# Patient Record
Sex: FEMALE | Race: WHITE | NOT HISPANIC OR LATINO
[De-identification: names, ages, dates, MRNs, and addresses within clinical notes are randomized per-mention and may not be internally consistent; named-entity substitution may affect disease eponyms.]

---

## 2018-08-01 ENCOUNTER — APPOINTMENT (OUTPATIENT)
Dept: MAMMOGRAPHY | Facility: IMAGING CENTER | Age: 63
End: 2018-08-01
Payer: MEDICARE

## 2018-08-01 ENCOUNTER — OUTPATIENT (OUTPATIENT)
Dept: OUTPATIENT SERVICES | Facility: HOSPITAL | Age: 63
LOS: 1 days | End: 2018-08-01
Payer: MEDICARE

## 2018-08-01 DIAGNOSIS — Z00.8 ENCOUNTER FOR OTHER GENERAL EXAMINATION: ICD-10-CM

## 2018-08-01 PROCEDURE — 77063 BREAST TOMOSYNTHESIS BI: CPT | Mod: 26

## 2018-08-01 PROCEDURE — 77067 SCR MAMMO BI INCL CAD: CPT | Mod: 26

## 2018-08-01 PROCEDURE — 77067 SCR MAMMO BI INCL CAD: CPT

## 2018-08-01 PROCEDURE — 77063 BREAST TOMOSYNTHESIS BI: CPT

## 2019-01-31 ENCOUNTER — RESULT REVIEW (OUTPATIENT)
Age: 64
End: 2019-01-31

## 2019-11-27 ENCOUNTER — APPOINTMENT (OUTPATIENT)
Dept: ULTRASOUND IMAGING | Facility: IMAGING CENTER | Age: 64
End: 2019-11-27
Payer: MEDICARE

## 2019-11-27 ENCOUNTER — OUTPATIENT (OUTPATIENT)
Dept: OUTPATIENT SERVICES | Facility: HOSPITAL | Age: 64
LOS: 1 days | End: 2019-11-27
Payer: MEDICARE

## 2019-11-27 ENCOUNTER — APPOINTMENT (OUTPATIENT)
Dept: MAMMOGRAPHY | Facility: IMAGING CENTER | Age: 64
End: 2019-11-27
Payer: MEDICARE

## 2019-11-27 DIAGNOSIS — Z00.8 ENCOUNTER FOR OTHER GENERAL EXAMINATION: ICD-10-CM

## 2019-11-27 PROCEDURE — 77067 SCR MAMMO BI INCL CAD: CPT | Mod: 26

## 2019-11-27 PROCEDURE — 76641 ULTRASOUND BREAST COMPLETE: CPT | Mod: 26,50

## 2019-11-27 PROCEDURE — 77063 BREAST TOMOSYNTHESIS BI: CPT | Mod: 26

## 2019-11-27 PROCEDURE — 77063 BREAST TOMOSYNTHESIS BI: CPT

## 2019-11-27 PROCEDURE — 76641 ULTRASOUND BREAST COMPLETE: CPT

## 2019-11-27 PROCEDURE — 77067 SCR MAMMO BI INCL CAD: CPT

## 2020-11-23 ENCOUNTER — RESULT REVIEW (OUTPATIENT)
Age: 65
End: 2020-11-23

## 2021-03-17 ENCOUNTER — APPOINTMENT (OUTPATIENT)
Dept: RADIOLOGY | Facility: IMAGING CENTER | Age: 66
End: 2021-03-17
Payer: MEDICARE

## 2021-03-17 ENCOUNTER — APPOINTMENT (OUTPATIENT)
Dept: ULTRASOUND IMAGING | Facility: IMAGING CENTER | Age: 66
End: 2021-03-17
Payer: MEDICARE

## 2021-03-17 ENCOUNTER — APPOINTMENT (OUTPATIENT)
Dept: MAMMOGRAPHY | Facility: IMAGING CENTER | Age: 66
End: 2021-03-17
Payer: MEDICARE

## 2021-03-17 ENCOUNTER — OUTPATIENT (OUTPATIENT)
Dept: OUTPATIENT SERVICES | Facility: HOSPITAL | Age: 66
LOS: 1 days | End: 2021-03-17
Payer: MEDICARE

## 2021-03-17 DIAGNOSIS — Z00.8 ENCOUNTER FOR OTHER GENERAL EXAMINATION: ICD-10-CM

## 2021-03-17 PROCEDURE — 76641 ULTRASOUND BREAST COMPLETE: CPT | Mod: 26,50

## 2021-03-17 PROCEDURE — 76641 ULTRASOUND BREAST COMPLETE: CPT

## 2021-03-17 PROCEDURE — 77063 BREAST TOMOSYNTHESIS BI: CPT

## 2021-03-17 PROCEDURE — 77080 DXA BONE DENSITY AXIAL: CPT | Mod: 26

## 2021-03-17 PROCEDURE — 77067 SCR MAMMO BI INCL CAD: CPT | Mod: 26

## 2021-03-17 PROCEDURE — 77063 BREAST TOMOSYNTHESIS BI: CPT | Mod: 26

## 2021-03-17 PROCEDURE — 77080 DXA BONE DENSITY AXIAL: CPT

## 2021-03-17 PROCEDURE — 77067 SCR MAMMO BI INCL CAD: CPT

## 2021-11-29 ENCOUNTER — RESULT REVIEW (OUTPATIENT)
Age: 66
End: 2021-11-29

## 2022-01-24 ENCOUNTER — RESULT REVIEW (OUTPATIENT)
Age: 67
End: 2022-01-24

## 2022-03-07 ENCOUNTER — RESULT REVIEW (OUTPATIENT)
Age: 67
End: 2022-03-07

## 2022-03-18 ENCOUNTER — APPOINTMENT (OUTPATIENT)
Dept: MAMMOGRAPHY | Facility: IMAGING CENTER | Age: 67
End: 2022-03-18
Payer: MEDICARE

## 2022-03-18 ENCOUNTER — OUTPATIENT (OUTPATIENT)
Dept: OUTPATIENT SERVICES | Facility: HOSPITAL | Age: 67
LOS: 1 days | End: 2022-03-18
Payer: MEDICARE

## 2022-03-18 ENCOUNTER — APPOINTMENT (OUTPATIENT)
Dept: ULTRASOUND IMAGING | Facility: IMAGING CENTER | Age: 67
End: 2022-03-18
Payer: MEDICARE

## 2022-03-18 DIAGNOSIS — Z00.8 ENCOUNTER FOR OTHER GENERAL EXAMINATION: ICD-10-CM

## 2022-03-18 PROCEDURE — 77063 BREAST TOMOSYNTHESIS BI: CPT

## 2022-03-18 PROCEDURE — 77063 BREAST TOMOSYNTHESIS BI: CPT | Mod: 26

## 2022-03-18 PROCEDURE — 77067 SCR MAMMO BI INCL CAD: CPT

## 2022-03-18 PROCEDURE — 76641 ULTRASOUND BREAST COMPLETE: CPT | Mod: 26,50

## 2022-03-18 PROCEDURE — 77067 SCR MAMMO BI INCL CAD: CPT | Mod: 26

## 2022-03-18 PROCEDURE — 76641 ULTRASOUND BREAST COMPLETE: CPT

## 2022-05-17 ENCOUNTER — OUTPATIENT (OUTPATIENT)
Dept: OUTPATIENT SERVICES | Facility: HOSPITAL | Age: 67
LOS: 1 days | End: 2022-05-17
Payer: MEDICARE

## 2022-05-17 VITALS
DIASTOLIC BLOOD PRESSURE: 84 MMHG | HEART RATE: 69 BPM | TEMPERATURE: 98 F | WEIGHT: 106.92 LBS | HEIGHT: 57.5 IN | RESPIRATION RATE: 18 BRPM | OXYGEN SATURATION: 98 % | SYSTOLIC BLOOD PRESSURE: 158 MMHG

## 2022-05-17 DIAGNOSIS — N84.0 POLYP OF CORPUS UTERI: ICD-10-CM

## 2022-05-17 DIAGNOSIS — Z01.812 ENCOUNTER FOR PREPROCEDURAL LABORATORY EXAMINATION: ICD-10-CM

## 2022-05-17 DIAGNOSIS — N83.202 UNSPECIFIED OVARIAN CYST, LEFT SIDE: ICD-10-CM

## 2022-05-17 DIAGNOSIS — I10 ESSENTIAL (PRIMARY) HYPERTENSION: ICD-10-CM

## 2022-05-17 DIAGNOSIS — E11.9 TYPE 2 DIABETES MELLITUS WITHOUT COMPLICATIONS: ICD-10-CM

## 2022-05-17 DIAGNOSIS — Z98.890 OTHER SPECIFIED POSTPROCEDURAL STATES: Chronic | ICD-10-CM

## 2022-05-17 LAB
A1C WITH ESTIMATED AVERAGE GLUCOSE RESULT: 6.2 % — HIGH (ref 4–5.6)
ANION GAP SERPL CALC-SCNC: 12 MMOL/L — SIGNIFICANT CHANGE UP (ref 7–14)
BLD GP AB SCN SERPL QL: NEGATIVE — SIGNIFICANT CHANGE UP
BUN SERPL-MCNC: 24 MG/DL — HIGH (ref 7–23)
CALCIUM SERPL-MCNC: 10.1 MG/DL — SIGNIFICANT CHANGE UP (ref 8.4–10.5)
CHLORIDE SERPL-SCNC: 102 MMOL/L — SIGNIFICANT CHANGE UP (ref 98–107)
CO2 SERPL-SCNC: 26 MMOL/L — SIGNIFICANT CHANGE UP (ref 22–31)
CREAT SERPL-MCNC: 0.67 MG/DL — SIGNIFICANT CHANGE UP (ref 0.5–1.3)
EGFR: 95 ML/MIN/1.73M2 — SIGNIFICANT CHANGE UP
ESTIMATED AVERAGE GLUCOSE: 131 — SIGNIFICANT CHANGE UP
GLUCOSE SERPL-MCNC: 89 MG/DL — SIGNIFICANT CHANGE UP (ref 70–99)
HCT VFR BLD CALC: 37.7 % — SIGNIFICANT CHANGE UP (ref 34.5–45)
HGB BLD-MCNC: 12 G/DL — SIGNIFICANT CHANGE UP (ref 11.5–15.5)
MCHC RBC-ENTMCNC: 28.4 PG — SIGNIFICANT CHANGE UP (ref 27–34)
MCHC RBC-ENTMCNC: 31.8 GM/DL — LOW (ref 32–36)
MCV RBC AUTO: 89.1 FL — SIGNIFICANT CHANGE UP (ref 80–100)
NRBC # BLD: 0 /100 WBCS — SIGNIFICANT CHANGE UP
NRBC # FLD: 0 K/UL — SIGNIFICANT CHANGE UP
PLATELET # BLD AUTO: 161 K/UL — SIGNIFICANT CHANGE UP (ref 150–400)
POTASSIUM SERPL-MCNC: 4.2 MMOL/L — SIGNIFICANT CHANGE UP (ref 3.5–5.3)
POTASSIUM SERPL-SCNC: 4.2 MMOL/L — SIGNIFICANT CHANGE UP (ref 3.5–5.3)
RBC # BLD: 4.23 M/UL — SIGNIFICANT CHANGE UP (ref 3.8–5.2)
RBC # FLD: 13.3 % — SIGNIFICANT CHANGE UP (ref 10.3–14.5)
RH IG SCN BLD-IMP: NEGATIVE — SIGNIFICANT CHANGE UP
SODIUM SERPL-SCNC: 140 MMOL/L — SIGNIFICANT CHANGE UP (ref 135–145)
WBC # BLD: 5.58 K/UL — SIGNIFICANT CHANGE UP (ref 3.8–10.5)
WBC # FLD AUTO: 5.58 K/UL — SIGNIFICANT CHANGE UP (ref 3.8–10.5)

## 2022-05-17 PROCEDURE — 93010 ELECTROCARDIOGRAM REPORT: CPT

## 2022-05-17 RX ORDER — MELOXICAM 15 MG/1
1 TABLET ORAL
Qty: 0 | Refills: 0 | DISCHARGE

## 2022-05-17 RX ORDER — SODIUM CHLORIDE 9 MG/ML
1000 INJECTION, SOLUTION INTRAVENOUS
Refills: 0 | Status: DISCONTINUED | OUTPATIENT
Start: 2022-05-17 | End: 2022-05-31

## 2022-05-17 RX ORDER — ASPIRIN/CALCIUM CARB/MAGNESIUM 324 MG
1 TABLET ORAL
Qty: 0 | Refills: 0 | DISCHARGE

## 2022-05-17 NOTE — H&P PST ADULT - NEGATIVE NEUROLOGICAL SYMPTOMS
no transient paralysis/no weakness/no generalized seizures/no syncope/no vertigo/no difficulty walking/no headache/no confusion

## 2022-05-17 NOTE — H&P PST ADULT - ATTENDING COMMENTS
I have consented the patient for the proposed procedure including for a pelvic examination under anesthesia by myself and the assistants in the room.

## 2022-05-17 NOTE — H&P PST ADULT - PROBLEM SELECTOR PLAN 4
Scheduled for laparoscopi b/l salpingo oophorectomy, peritoneal washings, operative hysteroscopy, polypectomy with myosure, d&c on 6/2/2022  Written & verbal preop instructions, gi prophylaxis & surgical soap given  Pt verbalized good understanding.  Teach back done on surgical soap instructions. Scheduled for laparoscopic b/l salpingo oophorectomy, peritoneal washings, operative hysteroscopy, polypectomy with myosure, d&c on 6/2/2022  Written & verbal preop instructions, gi prophylaxis & surgical soap given  Pt verbalized good understanding.  Teach back done on surgical soap instructions. Scheduled for laparoscopic b/l salpingo oophorectomy, peritoneal washings, operative hysteroscopy, polypectomy with myosure, d&c on 6/2/2022  Written & verbal preop instructions, gi prophylaxis & surgical soap given  Pt verbalized good understanding.  Teach back done on surgical soap instructions.  NELSON precautions recommended.

## 2022-05-17 NOTE — H&P PST ADULT - PROBLEM SELECTOR PLAN 1
Pt instructed to continue per routine schedule in Pm.  elevated b/o during PST visit.  Pt denies symptomatology  medical eval requested, pending copy of report.

## 2022-05-17 NOTE — H&P PST ADULT - TEMPERATURE IN CELSIUS (DEGREES C)
"To better understand risks for vision problems,please visit: [email or web address].org    To minimize eyestrain and Lower the risk of becoming near-sighted:   - Limit use of near electronic devices to no more than 20 minutes at a time, no more than 2 hours a day    - No electronic devices before age 2    -Avoid watching screens (TV, devices, etc.)  in complete darkness    - Spend 1-3 hours outdoors daily so that the eyes are exposed to natural light    Hyperopia (Farsightedness)      Farsightedness, or hyperopia, as it is medically termed, is a vision condition in which distant objects are usually seen clearly, but close ones do not come into proper focus. Farsightedness occurs if your eyeball is too short or the cornea has too little curvature, so light entering your eye is not focused correctly.  Common signs of farsightedness include difficulty in concentrating and maintaining a clear focus on near objects, eye strain, fatigue and/or headaches after close work, aching or burning eyes, irritability or nervousness after sustained concentration.  Common vision screenings, often done in schools, are generally ineffective in detecting farsightedness. A comprehensive optometric examination will include testing for farsightedness.  In mild cases of farsightedness, your eyes may be able to compensate without corrective lenses. In other cases, your optometrist can prescribe eyeglasses or contact lenses to optically correct farsightedness by altering the way the light enters your eyes      Courtesy of the [employer]      Kailey's eye discomfort is being caused by a spasm of the accommodative sytem.  Whenever we look up close, the eye focuses (or accomodates) to clear up the near target.  When we do a lot of near work (homework, reading, computers, phones, texting, tablets, handheld video games, etc), the focusing sytem can "get stuck" and go into a spasm. This causes eye strain, headaches and sometimes blurry " vision (far away and close up).  To address this, Limit use of near electronic devices to no more than 20 minutes at a time, no  More than 2 hours daily. Wearing +0.75 or +1.00 Over the counter glasses will also help to relieve any eyestrain.     Infant Vision:   Birth to 24 Months of Age    Babies learn to see over a period of time, much like they learn to walk and talk. They are not born with all the visual abilities they need in life. The ability to focus their eyes, move them accurately, and use them together as a team must be learned. Also, they need to learn how to use the visual information the eyes send to their brain in order to understand the world around them and interact with it appropriately.      Vision, and how the brain uses visual information, are learned skills.   From birth, babies begin exploring the wonders in the world with their eyes. Even before they learn to reach and grab with their hands or crawl and sit-up, their eyes are providing information and stimulation important for their development.  Healthy eyes and good vision play a critical role in how infants and children learn to see. Eye and vision problems in infants can cause developmental delays. It is important to detect any problems early to ensure babies have the opportunity to develop the visual abilities they need to grow and learn.  Parents play an important role in helping to assure their child's eyes and vision can develop properly. Steps that any parent should take include:  Watching for signs of eye and vision problems.   Seeking professional eye care starting with the first comprehensive vision assessment at about 6 months of age.   Helping their child develop his or her vision by engaging in age-appropriate activities.    Steps in Infant Vision Development  At birth, babies can't see as well as older children or adults. Their eyes and visual system aren't fully developed. But significant improvement occurs during the first  few months of life.  The following are some milestones to watch for in vision and child development. It is important to remember that not every child is the same and some may reach certain milestones at different ages.  Birth to four months      Up to about 3 months of age, babies' eyes do not focus on objects more than 8 to 10 inches from their faces.   At birth, babies' vision is abuzz with all kinds of visual stimulation. While they may look intently at a highly contrasted target, babies have not yet developed the ability to easily tell the difference between two targets or move their eyes between the two images. Their primary focus is on objects 8 to 10 inches from their face or the distance to parent's face.   During the first months of life, the eyes start working together and vision rapidly improves. Eye-hand coordination begins to develop as the infant starts tracking moving objects with his or her eyes and reaching for them. By eight weeks, babies begin to more easily focus their eyes on the faces of a parent or other person near them.   For the first two months of life, an infant's eyes are not well coordinated and may appear to wander or to be crossed. This is usually normal. However, if an eye appears to turn in or out constantly, an evaluation is warranted.   Babies should begin to follow moving objects with their eyes and reach for things at around three months of age.  Five to eight months  During these months, control of eye movements and eye-body coordination skills continue to improve.   Depth perception, which is the ability to  if objects are nearer or farther away than other objects, is not present at birth. It is not until around the fifth month that the eyes are capable of working together to form a three-dimensional view of the world and begin to see in depth.   Although an infant's color vision is not as sensitive as an adult's, it is generally believed that babies have good color  vision by five months of age.   Most babies start crawling at about 8 months old, which helps further develop eye-hand-foot-body coordination. Early walkers who did minimal crawling may not learn to use their eyes together as well as babies who crawl a lot.  Nine to twelve months      By the age of nine to twelve months, babies should be using their eyes and hands together.   At around 9 months of age, babies begin to pull themselves up to a standing position. By 10 months of age, a baby should be able to grasp objects with thumb and forefinger.   By twelve months of age, most babies will be crawling and trying to walk. Parents should encourage crawling rather than early walking to help the child develop better eye-hand coordination.   Babies can now  distances fairly well and throw things with precision.   One to two years old  By two years of age, a child's eye-hand coordination and depth perception should be well developed.   Children this age are highly interested in exploring their environment and in looking and listening. They recognize familiar objects and pictures in books and can scribble with crayon or pencil.      Signs of Eye and Vision Problems  The presence of eye and vision problems in infants is rare. Most babies begin life with healthy eyes and start to develop the visual abilities they will need throughout life without difficulty. But occasionally, eye health and vision problems can develop. Parents need to look for the following signs that may be indications of eye and vision problems:  Excessive tearing - this may indicate blocked tear ducts   Red or encrusted eye lids - this could be a sign of an eye infection   Constant eye turning - this may signal a problem with eye muscle control   Extreme sensitivity to light - this may indicate an elevated pressure in the eye   Appearance of a white pupil - this may indicate the presence of an eye cancer  The appearance of any of these signs should  require immediate attention by your pediatrician or optometrist.      What Parents Can do to Help With Visual Development  There are many things parents can do to help their baby's vision develop properly. The following are some examples of age-appropriate activities that can assist an infant's visual development.  Birth to four months   Use a nightlight or other dim lamp in your baby's room.   Change the crib's position frequently and change your child's position in it.   Keep reach-and-touch toys within your baby's focus, about eight to twelve inches.   Talk to your baby as you walk around the room.   Alternate right and left sides with each feeding.      Toys like building blocks can help boost fine motor skills and small muscle development.   Five to eight months  Hang a mobile, crib gym or various objects across the crib for the baby to grab, pull and kick.   Give the baby plenty of time to play and explore on the floor.   Provide plastic or wooden blocks that can be held in the hands.   Play raymond cake and other games, moving the baby's hands through the motions while saying the words aloud.  Nine to twelve months  Play hide and seek games with toys or your face to help the baby develop visual memory.   Name objects when talking to encourage the baby's word association and vocabulary development skills.   Encourage crawling and creeping.  One to two years  Roll a ball back and forth to help the child track objects with the eyes visually.   Give the child building blocks and balls of all shapes and sizes to play with to boost fine motor skills and small muscle development.   Read or tell stories to stimulate the child's ability to visualize and pave the way for learning and reading skills    Baby's First Eye Exam    An infant should receive his or her first eye exam between the ages of 6 and 12 months.    Even if no eye or vision problems are apparent, at about age 6 months, you should take your baby to your  "doctor of optometry for his or her first thorough eye examination.  Things that the optometrist will test for include:  excessive or unequal amounts of nearsightedness, farsightedness, or astigmatism   eye movement ability   eye health problems.   These problems are not common, but it is important to identify children who have them at this young age. Vision development and eye health problems are easier to correct if treatment begins early.    Courtesy of The [employer]     Vision:   2 to 5 Years of Age    Every experience a preschooler has is an opportunity for growth and development. They use their vision to guide other learning experiences. From ages 2 to 5, a child will be fine-tuning the visual abilities gained during infancy and developing new ones.   Stacking building blocks, rolling a ball back and forth, coloring, drawing, cutting, or assembling lock-together toys all help improve important visual skills. Preschoolers depend on their vision to learn tasks that will prepare them for school. They are developing the visually-guided eye-hand-body coordination, fine motor skills and visual perceptual abilities necessary to learn to read and write.      Steps taken at this age to help ensure vision is developing normally can provide a child with a good "head start" for school.   Preschoolers are eager to draw and look at pictures. Also, reading to young children is important to help them develop strong visualization skills as they "picture" the story in their minds.  This is also the time when parents need to be alert for the presence of vision problems like crossed eyes or lazy eye. These conditions often develop at this age. Crossed eyes or strabismus involves one or both eyes turning inward or outward. Amblyopia, commonly known as lazy eye, is a lack of clear vision in one eye, which can't be fully corrected with eyeglasses. Lazy eye often develops as a result of crossed eyes, but " "may occur without noticeable signs.   In addition, parents should watch their child for indication of any delays in development, which may signal the presence of a vision problem. Difficulty with recognition of colors, shapes, letters and numbers can occur if there is a vision problem.  The  years are a time for developing the visual abilities that a child will need in school and throughout his or her life. Steps taken during these years to help ensure vision is developing normally can provide a child with a good "head start" for school.        Signs of Eye and Vision Problems  According to the [employer], about 10% of preschoolers have eye or vision problems. However, children this age generally will not voice complaints about their eyes.   Parents should watch for signs that may indicate a vision problem, including:   Sitting close to the TV or holding a book too close   Squinting   Tilting their head   Frequently rubbing their eyes   Short attention span for the child's age   Turning of an eye in or out   Sensitivity to light   Difficulty with eye-hand-body coordination when playing ball or bike riding   Avoiding coloring activities, puzzles and other detailed activities  If you notice any of these signs in your preschooler, arrange for a visit to your doctor of optometry.      Understanding the Difference Between a Vision Screening and a Vision Examination  It is important to know that a vision screening by a child's pediatrician or at his or her  is not the same as a comprehensive eye and vision examination by an optometrist. Vision screenings are a limited process and can't be used to diagnose an eye or vision problem, but rather may indicate a potential need for further evaluation. They may miss as many as 60% of children with vision problems. Even if a vision screening does not identify a possible vision problem, a child may still have one.  Passing a vision " screening can give parents a false sense of security. Many  vision screenings only assess one or two areas of vision. They may not evaluate how well the child can focus his or her eyes or how well the eyes work together. Generally color vision, which is important to the use of color coded learning materials, is not tested.   By age 3, your child should have a thorough optometric eye examination to make sure his or her vision is developing properly and there is no evidence of eye disease. If needed, your doctor of optometry can prescribe treatment, including eyeglasses and/or vision therapy, to correct a vision development problem.  With today's diagnostic equipment and tests, a child does not have to know the alphabet or how to read to have his or her eyes examined. Here are several tips to make your child's optometric examination a positive experience:  1. Make an appointment early in the day. Allow about one hour.   2. Talk about the examination in advance and encourage your child's questions.   3. Explain the examination in terms your child can understand, comparing the E chart to a puzzle and the instruments to tiny flashlights and a kaleidoscope.  Unless your doctor of optometry advises otherwise, your child's next eye examination should be at age 5. By comparing test results of the two examinations, your optometrist can tell how well your child's vision is developing for the next major step into the school years.      What Parents Can Do to Help with  Vision Development      Playing with other children can help developing visual skills.   There are everyday things that parents can do at home to help their preschooler's vision develop as it should. There are a lot of ways to use playtime activities to help improve different visual skills.  Toys, games and playtime activities help by stimulating the process of vision development. Sometimes, despite all your efforts, your child may still miss a  step in vision development. This is why vision examinations at ages 3 and 5 are important to detect and treat these problems before a child begins school.  Here are several things that can be done at home to help your preschooler continue to successfully develop his or her visual skills:  Practice throwing and catching a ball or bean bag   Read aloud to your child and let him or her see what is being read   Provide a chalkboard or finger paints   Encourage play activities requiring hand-eye coordination such as block building and assembling puzzles   Play simple memory games   Provide opportunities to color, cut and paste   Make time for outdoor play including ball games, bike/tricycle riding, swinging and rolling activities   Encourage interaction with other children.    Courtesy of The [employer]      School-aged Vision:     A child needs many abilities to succeed in school. Good vision is a key. It has been estimated that as much as 80% of the learning a child does occurs through his or her eyes. Reading, writing, chalkboard work, and using computers are among the visual tasks students perform daily. A child's eyes are constantly in use in the classroom and at play. When his or her vision is not functioning properly, education and participation in sports can suffer.      As children progress in school, they face increasing demands on their visual abilities.   The school years are a very important time in every child's life. All parents want to see their children do well in school and most parents do all they can to provide them with the best educational opportunities. But too often one important learning tool may be overlooked - a child's vision.  As children progress in school, they face increasing demands on their visual abilities. The size of print in schoolbooks becomes smaller and the amount of time spent reading and studying increases significantly. Increased class work and homework  "place significant demands on the child's eyes. Unfortunately, the visual abilities of some students aren't performing up to the task.  When certain visual skills have not developed, or are poorly developed, learning is difficult and stressful, and children will typically:  Avoid reading and other near visual work as much as possible.   Attempt to do the work anyway, but with a lowered level of comprehension or efficiency.   Experience discomfort, fatigue and a short attention span.  Some children with learning difficulties exhibit specific behaviors of hyperactivity and distractibility. These children are often labeled as having "Attention Deficit Hyperactivity Disorder" (ADHD). However, undetected and untreated vision problems can elicit some of the very same signs and symptoms commonly attributed to ADHD. Due to these similarities, some children may be mislabeled as having ADHD when, in fact, they have an undetected vision problem.  Because vision may change frequently during the school years, regular eye and vision care is important. The most common vision problem is nearsightedness or myopia. However, some children have other forms of refractive error like farsightedness and astigmatism. In addition, the existence of eye focusing, eye tracking and eye coordination problems may affect school and sports performance.  Eyeglasses or contact lenses may provide the needed correction for many vision problems. However, a program of vision therapy may also be needed to help develop or enhance vision skills.    Vision Skills Needed For School Success      There are many visual skills beyond seeing clearly that team together to support academic success.   Vision is more than just the ability to see clearly, or having 20/20 eyesight. It is also the ability to understand and respond to what is seen. Basic visual skills include the ability to focus the eyes, use both eyes together as a team, and move them effectively. Other " "visual perceptual skills include:  recognition (the ability to tell the difference between letters like "b" and "d"),   comprehension (to "picture" in our mind what is happening in a story we are reading), and   retention (to be able to remember and recall details of what we read).  Every child needs to have the following vision skills for effective reading and learning:  Visual acuity -- the ability to see clearly in the distance for viewing the chalkboard, at an intermediate distance for the computer, and up close for reading a book.    Eye Focusing -- the ability to quickly and accurately maintain clear vision as the distance from objects change, such as when looking from the chalkboard to a paper on the desk and back. Eye focusing allows the child to easily maintain clear vision over time like when reading a book or writing a report.    Eye tracking -- the ability to keep the eyes on target when looking from one object to another, moving the eyes along a printed page, or following a moving object like a thrown ball.    Eye teaming -- the ability to coordinate and use both eyes together when moving the eyes along a printed page, and to be able to  distances and see depth for class work and sports.    Eye-hand coordination -- the ability to use visual information to monitor and direct the hands when drawing a picture or trying to hit a ball.    Visual perception -- the ability to organize images on a printed page into letters, words and ideas and to understand and remember what is read.  If any of these visual skills are lacking or not functioning properly, a child will have to work harder. This can lead to headaches, fatigue and other eyestrain problems. Parents and teachers need to be alert for symptoms that may indicate a child has a vision problem.      Signs of Eye and Vision Problems  A child may not tell you that he or she has a vision problem because they may think the way they see is the way everyone " sees.  Signs that may indicate a child has vision problem include:  Frequent eye rubbing or blinking   Short attention span   Avoiding reading and other close activities   Frequent headaches   Covering one eye   Tilting the head to one side   Holding reading materials close to the face   An eye turning in or out   Seeing double   Losing place when reading   Difficulty remembering what he or she reads    When is a Vision Exam Needed?      Your child should receive an eye examination at least once every two years-more frequently if specific problems or risk factors exist, or if recommended by your eye doctor.   Unfortunately, parents and educators often incorrectly assume that if a child passes a school screening, then there is no vision problem. However, many school vision screenings only test for distance visual acuity. A child who can see 20/20 can still have a vision problem. In reality, the vision skills needed for successful reading and learning are much more complex.  Even if a child passes a vision screening, they should receive a comprehensive optometric examination if:  They show any of the signs or symptoms of a vision problem listed above.   They are not achieving up to their potential.   They are minimally able to achieve, but have to use excessive time and effort to do so.  Vision changes can occur without your child or you noticing them. Therefore, your child should receive an eye examination at least once every two years-more frequently if specific problems or risk factors exist, or if recommended by your eye doctor. The earlier a vision problem is detected and treated, the more likely treatment will be successful. When needed, the doctor can prescribe treatment including eyeglasses, contact lenses or vision therapy to correct any vision problems.      Sports Vision and Eye Protection  Outdoor games and sports are an enjoyable and important part of most children's lives. Whether playing catch in the  back yard or participating in team sports at school, vision plays an important role in how well a child performs.  Specific visual skills needed for sports include:  Clear distance vision   Good depth perception   Wide field of vision   Effective eye-hand coordination  A child who consistently underperforms a certain skill in a sport, such as always hitting the front of the rim in basketball or swinging late at a pitched ball in baseball, may have a vision problem. If visual skills are not adequate, the child may continue to perform poorly. Correction of vision problems with eyeglasses or contact lenses, or a program of eye exercises called vision therapy can correct many vision problems, enhance vision skills, and improve sports vision performance. (Link to Sports Vision)  Eye protection should also be a major concern to all student athletes, especially in certain high-risk sports. Thousands of children suffer sports-related eye injuries each year and nearly all can be prevented by using the proper protective eyewear. That is why it is essential that all children wear appropriate, protective eyewear whenever playing sports. Eye protection should also be worn for other risky activities such as lawn mowing and trimming.  Regular prescription eyeglasses or contact lenses are not a substitute for appropriate, well-fitted protective eyewear. Athletes need to use sports eyewear that is tailored to protect the eyes while playing the specific sport. Your doctor of optometry can recommend specific sports eyewear to provide the level of protection needed.   It is also important for all children to protect their eyes from damage caused by ultraviolet radiation in sunlight. Sunglasses are needed to protect the eyes outdoors and some sport-specific designs may even help improve sports performance.      Learning-Related Vision Problems    By Fercho Watters, with updates and review by Mook Aguayo OD    Vision and learning are  "intimately related. In fact, experts say that roughly 80 percent of what a child learns in school is information that is presented visually. So good vision is essential for students of all ages to reach their full academic potential.  When children have difficulty in school -- from learning to read to understanding fractions to seeing the blackboard -- many parents and teachers believe these kids have vision problems.  And sometimes, they're right. Eyeglasses or contact lenses often help children better see the board in the front of the classroom and the books on their desk.  Ruling out simple refractive errors is the first step in making sure your child is visually ready for school. But nearsightedness, farsightedness and astigmatism are not the only visual disorders that can make learning more difficult.  Less obvious vision problems related to the way the eyes function and how the brain processes visual information also can limit your child's ability to learn.  Any vision problems that have the potential to affect academic and reading performance are considered learning-related vision problems.    Vision and Learning Disabilities  Learning-related vision problems are not learning disabilities. The [place] Individuals with Disabilities Education Act (IDEA)* defines a specific learning disability as: ". . . a disorder in one or more of the basic psychological processes involved in understanding or in using language, spoken or written, that may manifest itself in an imperfect ability to listen, think, speak, read, write, spell, or do mathematical calculations, including conditions such as perceptual disabilities, brain injury, minimal brain dysfunction, dyslexia, and developmental aphasia."  IDEA also says learning disabilities do not include learning problems that are primarily due to visual, hearing or motor disabilities. Mental retardation and emotional disturbances also are excluded as learning disabilities, along " "with learning problems related to environmental, cultural or economic disadvantage.  But specific vision problems can contribute to a child's learning problems, whether or not he has been diagnosed as "learning disabled." In other words, a child struggling in school may have a specific learning disability, a learning-related vision problem, or both.  If you are concerned about your child's performance in school, you need to find out the underlying cause (or causes) of the problem. The best way to do this is through a team approach that may include the child's teachers, the school psychologist, an eye doctor who specializes in children's vision and learning-related vision problems and perhaps other professionals.  Identifying all contributing causes of the learning problem increases the chances that the problem can be successfully treated.    Types of Learning-Related Vision Problems  Vision is a complex process that involves not only the eyes but the brain as well. Specific learning-related vision problems can be classified as one of three types. The first two types primarily affect visual input. The third primarily affects visual processing and integration.    If your child habitually places her head close to her book when reading, she may have a vision problem that can affect her ability to learn.     Eye health and refractive problems. These problems can affect the visual acuity in each eye as measured by an eye chart. Refractive errors include nearsightedness, farsightedness and astigmatism, but also include more subtle optical errors called higher-order aberrations. Eye health problems can cause low vision -- permanently decreased visual acuity that cannot be corrected by conventional eyeglasses, contact lenses or refractive surgery.    Functional vision problems. Functional vision refers to a variety of specific functions of the eye and the neurological control of these functions, such as eye teaming " (binocularity), fine eye movements (important for efficient reading), and accommodation (focusing amplitude, accuracy and flexibility). Deficits of functional visual skills can cause blurred or double vision, eye strain and headaches that can affect learning. Convergence insufficiency is a specific type of functional vision problem that affects the ability of the two eyes to stay accurately and comfortably aligned during reading.    Perceptual vision problems. Visual perception includes understanding what you see, identifying it, judging its importance and relating it to previously stored information in the brain. This means, for example, recognizing words that you have seen previously, and using the eyes and brain to form a mental picture of the words you see.  Most routine eye exams evaluate only the first of these categories of vision problems -- those related to eye health and refractive errors. However, many optometrists who specialize in children's vision problems and vision therapy offer exams to evaluate functional vision problems and perceptual vision problems that may affect learning.  Color blindness, though typically not considered a learning-related vision problem, may cause problems in school for young children with color vision problems if color-matching or identifying specific colors is required in classroom activities. For this reason, all children should have an eye exam that includes a color blind test prior to starting school.    Symptoms of Learning-Related Vision Problems  Symptoms of learning-related vision problems include:  Headaches or eye strain   Blurred vision or double vision   Crossed eyes or eyes that appear to move independently of each other (Read more about strabismus.)   Dislike or avoidance of reading and close work   Short attention span during visual tasks   Turning or tilting the head to use one eye only, or closing or covering one eye   Placing the head very close to the book  or desk when reading or writing   Excessive blinking or rubbing the eyes   Losing place while reading, or using a finger as a guide   Slow reading speed or poor reading comprehension   Difficulty remembering what was read   Omitting or repeating words, or confusing similar words   Persistent reversal of words or letters (after second grade)   Difficulty remembering, identifying or reproducing shapes   Poor eye-hand coordination   Evidence of developmental immaturity    Learning problems can lead to depression and low self-esteem. Seeing an eye doctor should be one of your first steps.   If your child shows one or more of these symptoms and is experiencing learning problems, it's possible he or she may have a learning-related vision problem.  To determine if such a problem exists, see an eye doctor who specializes in children's vision and learning-related vision problems for a comprehensive evaluation.  If no vision problem is detected, it's possible your child's symptoms are caused by a non-visual dysfunction, such as dyslexia or a learning disability. See an  for an evaluation to rule out these problems.  Signs of Attention and Developmental Disorders   Many people know attention disorders by the names attention deficit disorder (ADD) or attention deficit/hyperactivity disorder (ADHD). Frequently such children are put on drugs like Ritalin. Occasionally children with attention disorders experience other problems that contribute to inattentiveness, such as a speech and language dysfunction or nonverbal disorder. Consult a pediatric neurologist for a definitive diagnosis.  Parents can easily identify the three components of the autism spectrum disorder: lack of eye contact, inability to relate socially or inappropriate social interaction, and unusual repetitive interests that exclude other activities. Any or all of these early signs should prompt a consultation with your family doctor or  pediatrician.    Treatment of Learning-Related Vision Problems  If your child is diagnosed with a learning-related vision problem, treatment generally consists of an individualized and doctor-supervised program of vision therapy. Special eyeglasses also may be prescribed for either full-time wear or for specific tasks such as reading.  If your child is also receiving special education or other special services for a learning disability, ask the eye doctor who is supervising your child's vision therapy to contact your child's teacher and other professionals involved in his or her Individualized Education Program (IEP) or other remedial activities.  In some cases, vision therapy and remedial learning activities can be combined, and a cooperative effort to address your child's learning problems may be the best approach.  Also, keep in mind that children with learning difficulties may experience emotional problems as well, such as anxiety, depression and low self-esteem.  Reassure your child that learning problems and learning-related vision problems say nothing about a person's intelligence. Many children with learning difficulties have above-average IQs and simply process information differently than their peers.      ADULT VISION  19 to 40 Years of Age    Most adults, aged 19 to 40, enjoy healthy eyes and good vision. The most common eye and vision problems experienced by people in this age group are due to visual stress and eye injuries. By taking proper steps to maintain a healthy lifestyle and protect your eyes from stress and injury, you can avoid many eye and vision problems.  Good vision is important as you pursue a college degree, begin your career, or perhaps start and raise a family. Here are some things you can do to help maintain healthy eyes and good vision:  Eat Healthy -- As part of a healthful diet, eat five servings of fruits and vegetables each day. Choose foods rich in antioxidants like leafy, green  vegetables and fish.     Don't Smoke -- Smoking exposes your eyes to high levels of noxious chemicals and increases the risk for developing age-related macular degeneration (AMD) and cataracts.    Get Regular Exercise -- Exercise improves blood circulation, increases oxygen levels to the eyes and aids in the removal of toxins.    Wear Sunglasses -- Protect your eyes from harmful ultraviolet (UV) rays when outdoors. Choose sunglasses with UVA and UVB protection, to block both forms of ultraviolet rays.     Get Periodic Eye Examinations -- Although vision generally remains stable during these years, some problems may develop without any obvious signs or symptoms. The best way to protect your vision is through regularly scheduled professional eye examinations.  The American [employer] Association recommends that adults aged 19 to 40 receive an eye exam at least every two years. If you are at risk for eye problems due to a family history of eye disease, diabetes, high blood pressure or past vision problems, your doctor of optometry may recommend more frequent exams. In between examinations, if you notice a change in your vision, contact your doctor. Detecting and treating problems early can help maintain good vision for the rest of your life.    Dealing with Visual Stress at School or on the Job  Eyestrain is a common occurrence in today's visually demanding world. A typical college schedule or office workday involves spending long hours reading, working at a desk, or staring at a computer. A poorly designed study or work environment, with elements such as improper lighting, uncomfortable seating, incorrect viewing angles and improper reading or working distances can add to the visual stress. As the day progresses, the eyes begin to fatigue and eyestrain and discomfort can develop.      A poorly designed study or work environment, with elements such as improper lighting, uncomfortable seating, incorrect viewing angles  and improper reading or working distances can add to the visual stress.   The following are several key signs and symptoms of eyestrain:  Sore or tired eyes   Itching or burning sensations in the eyes   Sensitivity to light   Dry or watery eyes   Headaches   Difficulty focusing  Here are some simple steps you can take to minimize eyestrain, particularly during computer work:  Workplace Adjustments  Position the top of your computer monitor below eye level so you look slightly downward when viewing the screen. This will help minimize strain on the eyes and the neck. If you are typing from copy, position the text at the same level as the screen. Adjust the screen brightness so it is most comfortable for you. Avoid glare on the computer screen by adjusting window curtains or blinds, repositioning the monitor, or using a glare reduction filter.    Proper Lighting  Examine the lighting in your work area. Overhead lights can be harsh and often are brighter than necessary. Consider turning some of the lights off for a more comfortable lighting situation. Use an adjustable shaded lamp to provide specific task lighting as needed.    Rest Breaks  Throughout the day, give your eyes a chance to rest. Take several minutes every hour to look away from the computer and allow your eyes to re-adjust. Consider standing up and walking around or doing alternate tasks that do not require extensive near focusing. Blink often to refresh the eyes and use artificial tear solutions, if necessary.    Posture  When seated at a desk, make sure your feet are flat on the floor. Use a chair that is adjustable and provides adequate support for your back. When working at a computer, your arms should form a 90 degree angle at the elbows and your hands should be tilted up slightly to allow your fingers to travel freely over the keyboard.  Making these simple adjustments to your study or work area can pay big dividends in terms of preventing or  reducing eyestrain. If you continue to experience eye-related symptoms, you may have a vision problem requiring treatment. Ask your optometrist.      Ensuring Eye Safety at Work, Home or Play  The National [employer] Occup[employer] Safety and Health reports about 2,000 [place] workers sustain job-related eye injuries that require medical treatment each day. But more injuries to the eye actually result from use or misuse of products at home rather than on the job. Nearly 60 percent of all product-related eye injuries occur in and around the home, according to [employer].  Any injury to the eye has the potential for causing some vision loss or even blindness. Fortunately, most eye injuries can be prevented with the use of proper eye protection. Prevention involves being aware of the common causes of injury and knowing how to protect your eyes-at home, at work and at play.  At Work      Proper eye protection can help to lessen or prevent serious eye injuries.   Eye injuries occurring at work, whether in a factory, on a construction site, on a farm, or in a laboratory, can result from chemical burns, foreign objects in the eye and cuts and scrapes of the cornea. Common causes of injuries include  splashes with chemicals, grease and oil   burns from steam   ultraviolet or infrared radiation exposure; and flying wood or metal chips  Not all forms of safety eyewear provide the same level of protection from flying objects, chemical splashes or radiation exposure. Be sure to wear the appropriate protection for the type of eye hazards in your workplace.              At Home  Using common sense can help protect the eyes at home. Following 's instructions and safety warnings will help prevent many household product-related eye injuries.  Wearing eye protection while performing certain household activities can prevent eye injuries. Some activities include:  Cleaning the oven or using other strong  household chemicals   Chopping wood or doing woodworking   Using motorized equipment or power tools like lawn trimmers and electric drills   Jump-starting a car battery  Non-prescription safety goggles are sold at many home building stores and hardware stores. If you wear prescription glasses, ask your optometrist to make a recommendation on appropriate safety eyewear for household tasks.  At Play  Sprained ankles, skinned knees, and bruises are common occurrences when participating in sports. Unfortunately, so are injuries to the eye.  Regular eyeglasses and contact lenses do not offer adequate protection from sports-related eye injuries. Special eye protection is needed for basketball, football, hockey, baseball and racket sports. Choose the right goggles or protective eyewear for your sport. Your optometrist can advise you on the appropriate eye protection.      Protecting Your Eyes from the Sun  Even on an overcast day, harmful ultraviolet (UV) rays can damage both the skin and the surface of the eye. Over time, unprotected exposure to the sun can increase the risk of certain types of cataracts and cancers of the eyelids. UV, as well as blue light, has the potential to damage the retina, the light-sensitive lining at the back of the eye, which could lead to significant loss of vision. UV damage is cumulative, so it's never too late to begin protecting your eyes from the sun's harmful rays.  The following tips can help prevent eye damage from exposure to UV radiation:  Wear a wide-brimmed hat or cap. It can block up to half of the UV radiation, reducing the amount that may enter from above or around sunglasses.   Wear sunglasses any time your eyes are exposed to UV radiation, even on cloudy days and during winter months.   Look for quality sunglasses that offer good protection. Sunglasses should block out 99 to 100 percent of both UVA and UBB radiation and screen out 75 to 90 percent of visible light.    Courtesy  of The American [employer] Association    Adult Vision:   41 to 60 Years of Age    If you are over 40 years of age, you've probably noticed changes in your vision. Difficulty seeing clearly for reading and close work is among the most common problems adults develop between ages 41 to 60. However, this is also the time when other changes in your eyes can start to affect your work and enjoyment of life.   Beginning in the early to mid-forties, most adults may start to experience problems with their ability to see clearly at close distances, especially for reading and computer tasks. This normal aging change in the eye's focusing ability, called presbyopia, will continue to progress over time.      Many people in middle age begin to experience difficulty with their vision.   Initially, you may find you need to hold reading materials farther away to see them clearly. Print in the newspaper or on a restaurant menu may appear blurred, especially under dim lighting. If you already wear prescription glasses or contact lenses to see clearly in the distance, the near vision changes caused by presbyopia can bring about the need to use bifocal or multifocal lenses. If you are nearsighted, you may have discovered that you now need to remove you glasses to see better up close. Fortunately, people with presbyopia now have many options to improve their ability to see well.   Along with the onset of presbyopia, an increase in the incidence of eye health problems occurs during these years. Whether or not there is a need for eyeglasses, adults should be examined for signs of developing eye and vision problems. A comprehensive eye examination is recommended at least every two years. Don't rely on an insufficient substitute like the limited 's license vision test or other vision screenings to determine if you have an eye or vision problem.  Adults over 40 may be particularly at risk for the development of eye and vision problems  if any of the following exist:  Chronic, systemic conditions such as diabetes or high blood pressure.   A family history of glaucoma or macular degeneration.   A highly visually demanding job or work in an eye-hazardous occupation.   Health conditions like high cholesterol, thyroid conditions, anxiety or depression, and arthritis for which you take medications. Many medications, even antihistamines, have ocular side-effects.    Understanding Age-related Vision Changes  Just like your body, your eyes and vision change over time. Aging changes in various parts of the eye can result in a number of noticeable differences in how well you see. While not everyone will experience the same level of symptoms, the following are common age-related vision changes:  Need for More Light  As you age, you need more light to see as well as you did in years past. Brighter lights in your work area or next to your reading chair will help make reading and other near tasks easier.    Difficulty Reading and Doing Close Work  Printed materials are not as clear as before, in part because the lens in your eye becomes less flexible with time. This makes it harder for your eyes to focus near objects with the same ability you had when you were younger.    Problems with Glare  You may notice additional glare from headlights at night or sun reflecting off of windshields or pavement during the day, making it more difficult to drive. Changes within the lens in your eye cause light entering the eye to be scattered rather than focused precisely on the retina, thus creating more glare.    Changes in Color Perception  The normally clear lens located inside your eye may start to discolor making it harder to see and distinguish between certain shades of colors.    Reduced Tear Production  With age, the tear glands in your eyes will produce fewer tears. This is particularly true for women after menopause. As a result, your eyes may feel dry and irritated.  Having an adequate amount of tears is an essential element in keeping your eyes healthy and maintaining clear sight.      Encountering Problems with Near Vision after 40  If you have enjoyed relatively good vision throughout your life and haven't needed eyeglasses or contact lenses to correct distance vision, then the development of near vision problems after age 40 can be somewhat of a concern and a frustration. Losing the ability to read the newspaper or see the cell phone numbers may seem to have occurred abruptly. Actually, these changes have been occurring gradually since childhood. But up until now, your eyes have had adequate focusing power to allow you to see clearly for reading and close work. Now your eyes no longer have enough focusing power for clear and comfortable near vision tasks.      Persons with presbyopia have several options available to regain clear near vision.   This loss of focusing ability for near vision, called presbyopia, is simply the result of the lens inside the eye becoming less flexible. This flexibility allows the eye to change focus from objects are far to objects that are close. Persons with presbyopia have several options available to regain clear near vision. They include:  Eyeglasses, including single vision reading glasses and multifocal lenses   Contact lenses, including monovision and bifocal lenses   Laser surgery and other refractive surgery procedures  As you continue to age through your 50s and beyond, presbyopia becomes more advanced. You may notice the need for more frequent changes in eyeglass or contact lens prescriptions. Around age 60, these changes in near vision should stop and prescription changes should occur less frequently.  Presbyopia can't be prevented or cured, but many options are available to help compensate for the loss of near focusing ability. Most individuals should be able to obtain clear, comfortable near vision for all of their lifestyle needs.        Warning Signs of Eye Health Problems  This is also the time in life when your risk for developing a number of eye and vision problems increases. If you experience any of the following symptoms, you may have the early warning signs of a serious eye health problem:  Fluctuating Vision  If you experience frequent changes in how clearly you can see, it may be a sign of diabetes or hypertension (high blood pressure). These chronic conditions can damage the tiny blood vessels in the retina, the light sensitive layer at the back of the eye, causing vision loss that can sometimes be permanent.    Seeing Floaters and Flashes   Occasionally, you may see spots or floaters in your eyes. In most cases, these are actually shadowy images of particles floating in the fluid that fills the inside of the eye. Although they can be bothersome, spots and floaters are usually harmless and typically do not risk vision. They are a natural part of the eye's aging process. But if you suddenly see more floaters than normal, and they are accompanied by bright, flashing lights, they may be a warning sign of impending retinal detachment--a tear of the retina. This should be treated immediately to prevent serious loss of vision. (Link to Spots and Floaters)    Loss of Side Vision  If it seems that you are losing peripheral or side vision, this may be a sign of glaucoma. Glaucoma occurs when the optic nerve is damaged and no longer transmits all visual images to the brain. It often has no symptoms until damage to sections of your vision has begun. (Link to glaucoma)    Seeing distorted images   If straight lines appear distorted or wavy or there appears to be a blind spot or empty area in the center of your vision, you may have the signs of age-related macular degeneration (AMD). The disease affects the macula, the part of your retina that is responsible for central vision where the eye's acuity is sharpest. The disease causes a blind spot  that's right in the middle of your field of vision. (Link to age-related macular degeneration)   Regular eye examinations and early diagnosis and treatment of eye diseases can help you continue to preserve good vision throughout life.      Courtesy of The [employer]      Adult Vision:   Over 60 Years of Age    It's a fact of life that vision changes occur as you get older. But these changes don't have to compromise your lifestyle. Knowing what to expect and when to seek professional care are important steps to safeguarding your vision.  As you reach your 60s and beyond, you need to be attentive to warning signs of age-related eye health problems that could cause vision loss. Many eye diseases have no early symptoms. They may develop painlessly and you may not be aware of changes to your vision until the condition is quite advanced. But wise lifestyle choices and regular eye exams can significantly improve your chances of maintaining good eye health even as you age.      Safeguarding your vision as you age can have a tremendous impact on your quality of life.   You may not realize that health problems affecting other parts of your body can affect your vision as well. Individuals with diabetes or hypertension (high blood pressure), or taking medications that have eye-related side effects, are at greatest risk for developing vision problems.  Therefore, regular eye exams are even more important as you reach your senior years. The American [employer] Association recommends annual eye examinations for everyone over age 60. See your doctor of optometry immediately if you notice any changes in your vision.    Age-related Eye and Vision Problems  In the years after you turn 60, a number of eye diseases may develop that can change your vision permanently. The earlier these problems are detected and treated, the more likely you can retain good vision.  The following are some vision disorders of which you  36.5 should be aware:  Age-related macular degeneration (AMD) is an eye disease affecting the macula, the center of the light sensitive retina at the back of the eye, causing loss of central vision. Although small, the macula is the part of the retina that allows us to see fine detail and colors. Activities like reading, driving, watching TV and recognizing faces all require good central vision provided by the macula. While macular degeneration causes changes in central vision, peripheral or side vision remains unaffected.      An anuual eye exam can help catch evastating eye diseases, like glaucoma and macular degeneration, early. Early detection increases the chances of maintaining healthy vision in senior years.   Diabetic retinopathy is a condition occurring in people with diabetes. It is the result of progressive damage to the tiny blood vessels that nourish the retina. They leak blood and other fluids that cause swelling of retinal tissue and clouding of vision. The condition usually affects both eyes. The longer a person has diabetes, the more likely they will develop diabetic retinopathy, which can cause blindness.  Retinal detachment is a tearing or separation of the retina from the underlying tissue. It can be caused by trauma to the eye or head, health problems like advanced diabetes, and inflammatory disorders of the eye. But it most often occurs spontaneously as a result of changes to the gel-like vitreous fluid that fills the back of the eye. If not treated promptly, it can cause permanent vision loss.  Cataracts are cloudy or opaque areas in the normally clear lens of the eye. Depending upon their size and location, they can interfere with normal vision. Usually cataracts develop in both eyes, but one may be worse than the other. Cataracts can cause a decrease in contrast sensitivity, a dulling of colors and increased sensitivity to glare.  Glaucoma is a group of eye diseases characterized by damage to the  optic nerve resulting in vision loss. People with a family history of glaucoma,  Americans and older adults are at higher risk for developing the disease.   Dry eye is a condition in which there is an insufficient amount of tears or a poor quality of tears to lubricate and nourish the eye. Tears are necessary for maintaining the health of the front surface of the eye and for providing clear vision. Dry eyes are a common and often chronic problem, particularly in older adults.       Driving Safely After 60  If you are 60 or older, driving a car may be increasingly difficult. Age-related vision changes and eye diseases can compromise driving ability, even before you are aware of symptoms. You may be noticing difficulty judging distances and speed. Bright sunglight or the headlights of oncoming traffic at night may impair your vision.      Night driving with cortical cataract.  .   Some age-related vision changes that commonly affect seniors' driving are:  Not being able to see road signs as clearly   Having difficulty seeing objects up close like the car instrument panel or road maps   Changes in color perception   Problems seeing in low light or nighttime conditions   Difficulty adapting to glare from headlights   Experiencing a loss of side vision   These tips can help you stay safe when driving, especially at night:  Use extra caution at intersections.  Many collisions involving older drivers occur at intersections due to a failure to yield, especially when taking a left turn. Look carefully in both directions before proceeding into an intersection and turn your head frequently when driving to compensate for any decreased peripheral vision.   Reduce your speed and limit yourself to daytime driving.  If you are having trouble seeing at night or your eyes have difficulty recovering from the glare of oncoming headlights, slow down and avoid driving at night or on unfamilair roads, whenever possible.   Avoid  wearing eyeglasses and sunglasses with wide frames or temples.  Glasses with wide temples (side arms) may restrict your side vision.   Take a driving course for seniors.  Participate in a program for older drivers in your community, such as those offered by the [employer] Ret[employer] (AARP). This can help you learn more about physical changes that may affect your driving ability and how to compensate for them.   Have an annual vision examination.  Yearly eye exams can ensure your eyeglass or contact lenses prescription is up to date and provide for early detection of any developing eye health problem.      Dealing with Vision Loss      Low vision rehabilitative services can provide people with the help and resources needed to regain their independence.   Unfortunately, some people over 60 experience loss of sight beyond the normal, age-related vision changes. Macular degeneration, glaucoma, and diabetic retinopathy are among the eye health conditions that can lead to permanent vision loss. This loss of vision can take many forms and it may exist in varying degrees.  It is important to understand that visual acuity alone is not a good predictor of the degree of visual difficulty that a person may have. Someone with relatively good acuity (e.g., 20/40) can have difficulty functioning, while someone with worse acuity (e.g., 20/100) might not be experiencing any significant functional problems. Other visual factors such as poor depth perception, limited side vision, extreme sensitivity to lights and glare, and reduced color perception can also limit a person's ability to do everyday tasks.  However, low vision rehabilitative services can provide people with the help and resources needed to regain their independence. Individuals with low vision can be taught a variety of techniques to allow performance of daily activities with the remaining vision.  Your doctor of optometry can help plan a rehabilitation  program so that you may resume an independent life within your condition's limitations. A wide variety of rehabilitation options are available to help people with low vision live and work more effectively, efficiently, and safely. Most people can be helped with one or more low vision treatment options. The more commonly prescribed devices are:  Spectacle-mounted magnifiers -- A magnifying lens is mounted in spectacles (this type of system is called a microscope) or on a special headband. This allows use of both hands to complete a close-up task, such as writing a letter.   Hand-held or spectacle-mounted telescopes -- These miniature telescopes are useful for seeing longer distances, such as across the room to watch television, and can also be modified for near (reading) tasks.   Hand-held and stand magnifiers -- These are convenient for short-term reading of things such as price tags, labels, and instrument dials. Both types can be equipped with lights.   Video magnification -- Table-top (closed-circuit television) or head-mounted systems enlarge reading material on a video display. Some systems can be used for distance views tasks. These are portable systems, and those that can be used with a computer or monitor. Image brightness, image size, contrast, and foreground/background color and illumination can be customized.   In addition, there are numerous other products to assist those with a vision impairment, such as large-type books, magazines, and newspapers, books-on-tape, talking wristwatches, self-threading needles, and more.      Courtesy of The [employer]      Impact of Computer Use on Children's Vision    When first introduced, computers were almost exclusively used by adults. Today, children increasingly use these devices both for education and recreation. Millions of children use computers on a daily basis at school and at home.    Children can experience many of the same symptoms  "related to computer use as adults. Extensive viewing of the computer screen can lead to eye discomfort, fatigue, blurred vision and headaches. However, some unique aspects of how children use computers may make them more susceptible than adults to the development of these problems.    The potential impact of computer use on children's vision involves the following factors:  Children often have a limited degree of self-awareness. Many children keep performing an enjoyable task with great concentration until near exhaustion (e.g., playing video games for hours with little, if any, breaks). Prolonged activity without a significant break can cause eye focusing (accommodative) problems and eye irritation.    Accommodative problems may occur as a result of the eyes' focusing system "locking in" to a particular target and viewing distance. In some cases, this may cause the eyes to be unable to smoothly and easily focus on a particular object, even long after the original work is completed.    Children are very adaptable. Although there are many positive aspects to their adaptability, children frequently ignore problems that would be addressed by adults. A child who is viewing a computer screen with a large amount of glare often will not think about changing the computer arrangement or the surroundings to achieve more comfortable viewing. This can result in excessive eye strain. Discomfort can also result from dryness due to infrequent blinking. Also, children often accept blurred vision caused by nearsightedness (myopia), farsightedness (hyperopia), or astigmatism because they think everyone sees the way they do. Uncorrected farsightedness can cause eye strain, even when clear vision can be maintained.    Children are not the same size as adults. Most computer workstations are arranged for adult use. Therefore, a child using a computer on a typical office desk often must look up higher than an adult. Since the most " efficient viewing angle is slightly downward about 15 degrees, problems using the eyes together can occur. In addition, children may have difficulty reaching the keyboard or placing their feet on the floor, causing arm, neck or back discomfort.    Steps to Visually-Friendly Computer Use  Here are some things to consider for children using a computer:  Have the child's vision checked. A comprehensive eye examination will ensure that the child can see clearly and comfortably and detect any hidden conditions that may contribute to eye strain. When necessary, glasses, contact lenses or vision therapy can provide clear, comfortable vision for computer use.  Build in break times. A brief break every hour will minimize the development of eye focusing problems and eye irritation.  Carefully check the height and position of the computer. The child's size should determine where the monitor and keyboard are placed. In many situations, the computer monitor will be too high in the child's field of view. A good solution to many of these problems is an adjustable chair that can be raised for the child's comfort. A foot stool may be helpful in supporting the child's feet.  Carefully check for glare and reflections on the computer screen. Position the monitor to minimize glare. Windows or other light sources should not be directly visible when sitting in front of the monitor. When this occurs, the desk or computer may be turned to prevent glare on the screen. Sometimes glare is less obvious.   Adjust the amount of lighting in the room for sustained comfort      Courtesy of the [employer] Association              .

## 2022-05-17 NOTE — H&P PST ADULT - HISTORY OF PRESENT ILLNESS
68y/o female presents for preop eval for   Pt states had recent routine GYN eval.  Imaging show ovarian cyst & uterine  68y/o female presents for preop eval for scheduled for laparoscopic b/l salpingo oophorectomy, peritoneal washings, operative hysteroscopy, polypectomy with myosure, d&c on 6/2/2022    Pt states had recent routine GYN eval.  Imaging show ovarian cyst & uterine

## 2022-05-17 NOTE — H&P PST ADULT - NSICDXPASTMEDICALHX_GEN_ALL_CORE_FT
PAST MEDICAL HISTORY:  Depression with anxiety     Dyslipidemia     Hypertension     Type 2 diabetes mellitus

## 2022-06-01 ENCOUNTER — TRANSCRIPTION ENCOUNTER (OUTPATIENT)
Age: 67
End: 2022-06-01

## 2022-06-01 NOTE — ASU PATIENT PROFILE, ADULT - FALL HARM RISK - UNIVERSAL INTERVENTIONS
Bed in lowest position, wheels locked, appropriate side rails in place/Call bell, personal items and telephone in reach/Instruct patient to call for assistance before getting out of bed or chair/Non-slip footwear when patient is out of bed/[place] to call system/Physically safe environment - no spills, clutter or unnecessary equipment/Purposeful Proactive Rounding/Room/bathroom lighting operational, light cord in reach

## 2022-06-02 ENCOUNTER — RESULT REVIEW (OUTPATIENT)
Age: 67
End: 2022-06-02

## 2022-06-02 ENCOUNTER — OUTPATIENT (OUTPATIENT)
Dept: OUTPATIENT SERVICES | Facility: HOSPITAL | Age: 67
LOS: 1 days | Discharge: HOME | End: 2022-06-02
Payer: MEDICARE

## 2022-06-02 ENCOUNTER — TRANSCRIPTION ENCOUNTER (OUTPATIENT)
Age: 67
End: 2022-06-02

## 2022-06-02 VITALS
RESPIRATION RATE: 16 BRPM | HEART RATE: 72 BPM | OXYGEN SATURATION: 98 % | SYSTOLIC BLOOD PRESSURE: 154 MMHG | DIASTOLIC BLOOD PRESSURE: 70 MMHG

## 2022-06-02 VITALS
WEIGHT: 106.92 LBS | HEIGHT: 57.5 IN | OXYGEN SATURATION: 99 % | HEART RATE: 72 BPM | TEMPERATURE: 99 F | RESPIRATION RATE: 15 BRPM | DIASTOLIC BLOOD PRESSURE: 73 MMHG | SYSTOLIC BLOOD PRESSURE: 189 MMHG

## 2022-06-02 DIAGNOSIS — Z98.890 OTHER SPECIFIED POSTPROCEDURAL STATES: Chronic | ICD-10-CM

## 2022-06-02 DIAGNOSIS — N83.202 UNSPECIFIED OVARIAN CYST, LEFT SIDE: ICD-10-CM

## 2022-06-02 LAB — GLUCOSE BLDC GLUCOMTR-MCNC: 129 MG/DL — HIGH (ref 70–99)

## 2022-06-02 PROCEDURE — 88305 TISSUE EXAM BY PATHOLOGIST: CPT | Mod: 26

## 2022-06-02 PROCEDURE — 88112 CYTOPATH CELL ENHANCE TECH: CPT | Mod: 26

## 2022-06-02 DEVICE — MYOSURE TISSUE REMOVAL DEVICE LITE
Type: IMPLANTABLE DEVICE | Status: NON-FUNCTIONAL
Removed: 2022-06-02

## 2022-06-02 RX ORDER — FENTANYL CITRATE 50 UG/ML
50 INJECTION INTRAVENOUS
Refills: 0 | Status: DISCONTINUED | OUTPATIENT
Start: 2022-06-02 | End: 2022-06-02

## 2022-06-02 RX ORDER — METFORMIN HYDROCHLORIDE 850 MG/1
2 TABLET ORAL
Qty: 0 | Refills: 0 | DISCHARGE

## 2022-06-02 RX ORDER — FLUOXETINE HCL 10 MG
1 CAPSULE ORAL
Qty: 0 | Refills: 0 | DISCHARGE

## 2022-06-02 RX ORDER — ONDANSETRON 8 MG/1
4 TABLET, FILM COATED ORAL ONCE
Refills: 0 | Status: DISCONTINUED | OUTPATIENT
Start: 2022-06-02 | End: 2022-06-16

## 2022-06-02 RX ORDER — ASPIRIN/CALCIUM CARB/MAGNESIUM 324 MG
1 TABLET ORAL
Qty: 0 | Refills: 0 | DISCHARGE

## 2022-06-02 RX ORDER — MELOXICAM 15 MG/1
1 TABLET ORAL
Qty: 0 | Refills: 0 | DISCHARGE

## 2022-06-02 RX ORDER — SIMVASTATIN 20 MG/1
1 TABLET, FILM COATED ORAL
Qty: 0 | Refills: 0 | DISCHARGE

## 2022-06-02 NOTE — ASU PREOP CHECKLIST - SELECT TESTS ORDERED
HgA1C/BMP/CBC/EKG/POCT Blood Glucose/COVID-19 HgA1C/BMP/CBC/Type and Screen/EKG/POCT Blood Glucose/COVID-19

## 2022-06-02 NOTE — ASU DISCHARGE PLAN (ADULT/PEDIATRIC) - ACTIVITY LEVEL
No excercise/No heavy lifting/No sports/gym/No weight bearing/Nothing per vagina/No tub baths/No douching/No tampons

## 2022-06-02 NOTE — ASU DISCHARGE PLAN (ADULT/PEDIATRIC) - PROCEDURE
Dilation and Curettage, Diagnostic Hysteroscopy, Laparoscopic Bilateral-Salpingoopherectomy, Lysis of Adhesions

## 2022-06-02 NOTE — BRIEF OPERATIVE NOTE - NSICDXBRIEFPREOP_GEN_ALL_CORE_FT
PRE-OP DIAGNOSIS:  Left ovarian cyst 02-Jun-2022 11:02:51  Margoth Gary  Endometrial polyp 02-Jun-2022 11:03:00  Margoth Gary

## 2022-06-02 NOTE — ASU DISCHARGE PLAN (ADULT/PEDIATRIC) - NS MD DC FALL RISK RISK
For information on Fall & Injury Prevention, visit: [email or web address] OR  [email or web address] OR  https://www.cdc.gov/steadi/patient.html

## 2022-06-02 NOTE — ASU DISCHARGE PLAN (ADULT/PEDIATRIC) - NURSING INSTRUCTIONS
You received IV Tylenol for pain management at 10:45 AM. Please DO NOT take any Tylenol (Acetaminophen) containing products, such as Vicodin, Percocet, Excedrin, and cold medications for the next 6 hours (until 6:45 PM). DO NOT TAKE MORE THAN 3000 MG OF TYLENOL in a 24 hour period.

## 2022-06-02 NOTE — BRIEF OPERATIVE NOTE - NSICDXBRIEFPROCEDURE_GEN_ALL_CORE_FT
PROCEDURES:  Laparoscopic bilateral salpingo-oophorectomy 02-Jun-2022 11:01:07  Margoth Gary  Lysis of adhesions, ureter, laparoscopic 02-Jun-2022 11:01:22  Margoth Gary  Hysteroscopy, dilation and curettage of uterus, endometrial biopsy, and polypectomy 02-Jun-2022 11:01:48  Margoth Gary

## 2022-06-02 NOTE — BRIEF OPERATIVE NOTE - OPERATION/FINDINGS
Hysteroscopy was performed under laparoscopic guidance due to stenotic internal cervical os and revealed a small sessile area of thickening, possible polyp on the posterior aspect of the uterine cavity. The ostia were seen and noted to be stenotic bilaterally. The endometrium appeared atrophic otherwise.   Laparoscopy revealed old endometriotic implants in the posterior cul de sac. The ovaries and tubes were adherent to the bilateral sidewalls. the right tube and ovary appeared normal. The left tube appeared dilated. The left ovary appeared normal. There were dense adhesions noted between the left ovary and tube and the left ureter and sigmoid. Ureterolysis was performed in order to ensure no injury to the ureter on this side. The liver, stomach, intestines appeared normal.

## 2022-06-02 NOTE — BRIEF OPERATIVE NOTE - NSICDXBRIEFPOSTOP_GEN_ALL_CORE_FT
POST-OP DIAGNOSIS:  Endometrial polyp 02-Jun-2022 11:03:13  Margoth Gary  Left ovarian cyst 02-Jun-2022 11:03:23  Margoth Gary  Pelvic adhesive disease 02-Jun-2022 11:03:33  Margoth Gary  Endometriosis 02-Jun-2022 11:03:42  Margoth Gary

## 2022-06-02 NOTE — ASU DISCHARGE PLAN (ADULT/PEDIATRIC) - CARE PROVIDER_API CALL
Margoth Gary)  Obstetrics and Gynecology  [address], [address]  [place] [address]  Phone: [phone]  Fax: [phone]  Follow Up Time: 2 weeks

## 2022-06-06 LAB — NON-GYNECOLOGICAL CYTOLOGY STUDY: SIGNIFICANT CHANGE UP

## 2022-06-21 LAB — SURGICAL PATHOLOGY STUDY: SIGNIFICANT CHANGE UP

## 2023-06-26 PROBLEM — I10 ESSENTIAL (PRIMARY) HYPERTENSION: Chronic | Status: ACTIVE | Noted: 2022-05-17

## 2023-06-26 PROBLEM — F41.8 OTHER SPECIFIED ANXIETY DISORDERS: Chronic | Status: ACTIVE | Noted: 2022-05-17

## 2023-06-26 PROBLEM — E11.9 TYPE 2 DIABETES MELLITUS WITHOUT COMPLICATIONS: Chronic | Status: ACTIVE | Noted: 2022-05-17

## 2023-06-26 PROBLEM — E78.5 HYPERLIPIDEMIA, UNSPECIFIED: Chronic | Status: ACTIVE | Noted: 2022-05-17

## 2023-07-20 ENCOUNTER — APPOINTMENT (OUTPATIENT)
Dept: MAMMOGRAPHY | Facility: IMAGING CENTER | Age: 68
End: 2023-07-20
Payer: MEDICARE

## 2023-07-20 ENCOUNTER — APPOINTMENT (OUTPATIENT)
Dept: RADIOLOGY | Facility: IMAGING CENTER | Age: 68
End: 2023-07-20
Payer: MEDICARE

## 2023-07-20 ENCOUNTER — OUTPATIENT (OUTPATIENT)
Dept: OUTPATIENT SERVICES | Facility: HOSPITAL | Age: 68
LOS: 1 days | End: 2023-07-20
Payer: MEDICARE

## 2023-07-20 ENCOUNTER — APPOINTMENT (OUTPATIENT)
Dept: ULTRASOUND IMAGING | Facility: IMAGING CENTER | Age: 68
End: 2023-07-20
Payer: MEDICARE

## 2023-07-20 DIAGNOSIS — Z00.8 ENCOUNTER FOR OTHER GENERAL EXAMINATION: ICD-10-CM

## 2023-07-20 DIAGNOSIS — Z98.890 OTHER SPECIFIED POSTPROCEDURAL STATES: Chronic | ICD-10-CM

## 2023-07-20 PROCEDURE — 77063 BREAST TOMOSYNTHESIS BI: CPT | Mod: 26

## 2023-07-20 PROCEDURE — 76641 ULTRASOUND BREAST COMPLETE: CPT | Mod: 26,50,GZ

## 2023-07-20 PROCEDURE — 77080 DXA BONE DENSITY AXIAL: CPT | Mod: 26

## 2023-07-20 PROCEDURE — 77067 SCR MAMMO BI INCL CAD: CPT

## 2023-07-20 PROCEDURE — 77067 SCR MAMMO BI INCL CAD: CPT | Mod: 26

## 2023-07-20 PROCEDURE — 77063 BREAST TOMOSYNTHESIS BI: CPT

## 2023-07-20 PROCEDURE — 77080 DXA BONE DENSITY AXIAL: CPT

## 2023-07-20 PROCEDURE — 76641 ULTRASOUND BREAST COMPLETE: CPT

## 2023-10-02 NOTE — ASU DISCHARGE PLAN (ADULT/PEDIATRIC) - NO HEAVY LIFTING DURATION
Update History & Physical    The patient's History and Physical of September 27, 2023 was reviewed with the patient and I examined the patient. There was no change. The surgical site was confirmed by the patient and me. Plan: The risks, benefits, expected outcome, and alternative to the recommended procedure have been discussed with the patient. Patient understands and wants to proceed with the procedure.      Electronically signed by Giorgio Dorsey DO on 10/2/2023 at 10:25 AM
6 weeks

## 2024-05-16 ENCOUNTER — APPOINTMENT (OUTPATIENT)
Dept: UROLOGY | Facility: CLINIC | Age: 69
End: 2024-05-16
Payer: MEDICARE

## 2024-05-16 VITALS
SYSTOLIC BLOOD PRESSURE: 136 MMHG | HEART RATE: 76 BPM | HEIGHT: 59 IN | WEIGHT: 105 LBS | RESPIRATION RATE: 17 BRPM | TEMPERATURE: 98.3 F | BODY MASS INDEX: 21.17 KG/M2 | DIASTOLIC BLOOD PRESSURE: 68 MMHG

## 2024-05-16 DIAGNOSIS — Z87.19 PERSONAL HISTORY OF OTHER DISEASES OF THE DIGESTIVE SYSTEM: ICD-10-CM

## 2024-05-16 DIAGNOSIS — R35.0 FREQUENCY OF MICTURITION: ICD-10-CM

## 2024-05-16 DIAGNOSIS — Z86.39 PERSONAL HISTORY OF OTHER ENDOCRINE, NUTRITIONAL AND METABOLIC DISEASE: ICD-10-CM

## 2024-05-16 DIAGNOSIS — Z86.79 PERSONAL HISTORY OF OTHER DISEASES OF THE CIRCULATORY SYSTEM: ICD-10-CM

## 2024-05-16 DIAGNOSIS — N95.8 OTHER SPECIFIED MENOPAUSAL AND PERIMENOPAUSAL DISORDERS: ICD-10-CM

## 2024-05-16 DIAGNOSIS — N36.42 INTRINSIC SPHINCTER DEFICIENCY (ISD): ICD-10-CM

## 2024-05-16 PROCEDURE — 51798 US URINE CAPACITY MEASURE: CPT

## 2024-05-16 PROCEDURE — 99204 OFFICE O/P NEW MOD 45 MIN: CPT

## 2024-05-16 PROCEDURE — G2211 COMPLEX E/M VISIT ADD ON: CPT

## 2024-05-16 NOTE — ASSESSMENT
[FreeTextEntry1] : Plan:  PVR 0 cc  UA and cutlure obtained   Discussed non-surgical including pelvic floor physical therapy, and pessary with knob.  Discussed surgical interventions including loida-urethral bulking agent, abdominal approaches to to urinary incontinence, and synthetic and fascial slings.  The risks/benefits/alternatives of each were discussed.  patient opted to proceed with Bulkamid aware auth is required before the appt  Patient understands that this procedure can last anywhere between 1-6 years based on  long-term data She understands that a repeat bulking procedure may be necessary at some point She also understands there is a 1% chance of urinary retention requiring short term temporary catheterization Other risks including hematuria, infection, and pain were discussed.

## 2024-05-16 NOTE — PHYSICAL EXAM
[General Appearance - Well Developed] : well developed [Normal Appearance] : normal appearance [Edema] : no peripheral edema [Abdomen Soft] : soft [Normal Station and Gait] : the gait and station were normal for the patient's age [] : no rash [Not Anxious] : not anxious [de-identified] : negative cough stress test

## 2024-05-16 NOTE — END OF VISIT
[FreeTextEntry3] : Periurethral bulking agent with Bulkamid Patient understands that this procedure can last anywhere between 1-6 years based on  long-term data She understands that a repeat bulking procedure may be necessary at some point She also understands there is a 1% chance of urinary retention requiring short term temporary catheterization Other risks including hematuria, infection, and pain were discussed  will seek auth  pvr = 0 cc  The total time spent with the patient includes face to face time as well as time for documentation, ordering medications/labs/procedures, and care coordination, but I acknowledge it does not include time spent on any procedures performed (eg PVR, UDS, Cystoscopy, catheter changes, etc).  Time includes reviewing the chart prior to visit, documentation, and correspondence.

## 2024-05-16 NOTE — HISTORY OF PRESENT ILLNESS
[FreeTextEntry1] : 69 yr old female patient with hx of HTN, DMII, and HLD presents to office today for evaluation of urinary incontinence x years. Has not tried any medications for OAB She has tried physical therapy exercises for STUART Sister is a patient of ours.   DTF q45min - 1hr  Nocturia 2x  denies of UUI + STUART  uses pad daily - 1pad daily  do not strain to void  feels emptied post void   PVR - 0 cc   Never smoker   2-3 bottle of the 17oz water

## 2024-05-17 ENCOUNTER — NON-APPOINTMENT (OUTPATIENT)
Age: 69
End: 2024-05-17

## 2024-05-17 LAB
APPEARANCE: CLEAR
BILIRUBIN URINE: NEGATIVE
BLOOD URINE: NEGATIVE
COLOR: YELLOW
GLUCOSE QUALITATIVE U: NEGATIVE MG/DL
KETONES URINE: NEGATIVE MG/DL
LEUKOCYTE ESTERASE URINE: NEGATIVE
NITRITE URINE: NEGATIVE
PH URINE: 6
PROTEIN URINE: NEGATIVE MG/DL
SPECIFIC GRAVITY URINE: 1.01
UROBILINOGEN URINE: 0.2 MG/DL

## 2024-08-23 ENCOUNTER — APPOINTMENT (OUTPATIENT)
Dept: MAMMOGRAPHY | Facility: IMAGING CENTER | Age: 69
End: 2024-08-23
Payer: MEDICARE

## 2024-08-23 ENCOUNTER — OUTPATIENT (OUTPATIENT)
Dept: OUTPATIENT SERVICES | Facility: HOSPITAL | Age: 69
LOS: 1 days | End: 2024-08-23
Payer: MEDICARE

## 2024-08-23 DIAGNOSIS — Z98.890 OTHER SPECIFIED POSTPROCEDURAL STATES: Chronic | ICD-10-CM

## 2024-08-23 DIAGNOSIS — Z00.8 ENCOUNTER FOR OTHER GENERAL EXAMINATION: ICD-10-CM

## 2024-08-23 PROCEDURE — 77063 BREAST TOMOSYNTHESIS BI: CPT | Mod: 26

## 2024-08-23 PROCEDURE — 77063 BREAST TOMOSYNTHESIS BI: CPT

## 2024-08-23 PROCEDURE — 77067 SCR MAMMO BI INCL CAD: CPT

## 2024-08-23 PROCEDURE — 77067 SCR MAMMO BI INCL CAD: CPT | Mod: 26

## 2024-09-10 ENCOUNTER — APPOINTMENT (OUTPATIENT)
Dept: UROLOGY | Facility: CLINIC | Age: 69
End: 2024-09-10

## 2024-09-10 ENCOUNTER — OUTPATIENT (OUTPATIENT)
Dept: OUTPATIENT SERVICES | Facility: HOSPITAL | Age: 69
LOS: 1 days | End: 2024-09-10
Payer: MEDICARE

## 2024-09-10 VITALS — SYSTOLIC BLOOD PRESSURE: 150 MMHG | HEART RATE: 73 BPM | DIASTOLIC BLOOD PRESSURE: 82 MMHG

## 2024-09-10 DIAGNOSIS — N36.42 INTRINSIC SPHINCTER DEFICIENCY (ISD): ICD-10-CM

## 2024-09-10 DIAGNOSIS — Z98.890 OTHER SPECIFIED POSTPROCEDURAL STATES: Chronic | ICD-10-CM

## 2024-09-10 PROCEDURE — L8606: CPT

## 2024-09-10 PROCEDURE — 51715 ENDOSCOPIC INJECTION/IMPLANT: CPT

## 2024-09-11 DIAGNOSIS — N36.42 INTRINSIC SPHINCTER DEFICIENCY (ISD): ICD-10-CM

## 2024-09-11 DIAGNOSIS — R35.0 FREQUENCY OF MICTURITION: ICD-10-CM

## 2024-09-24 ENCOUNTER — APPOINTMENT (OUTPATIENT)
Dept: ULTRASOUND IMAGING | Facility: IMAGING CENTER | Age: 69
End: 2024-09-24

## 2024-10-10 ENCOUNTER — APPOINTMENT (OUTPATIENT)
Dept: UROLOGY | Facility: CLINIC | Age: 69
End: 2024-10-10
Payer: MEDICARE

## 2024-10-10 DIAGNOSIS — N36.42 INTRINSIC SPHINCTER DEFICIENCY (ISD): ICD-10-CM

## 2024-10-10 PROCEDURE — 99212 OFFICE O/P EST SF 10 MIN: CPT

## 2024-10-11 LAB
APPEARANCE: CLEAR
BILIRUBIN URINE: NEGATIVE
BLOOD URINE: NEGATIVE
COLOR: YELLOW
GLUCOSE QUALITATIVE U: NEGATIVE MG/DL
KETONES URINE: NEGATIVE MG/DL
LEUKOCYTE ESTERASE URINE: NEGATIVE
NITRITE URINE: NEGATIVE
PH URINE: 6.5
PROTEIN URINE: NEGATIVE MG/DL
SPECIFIC GRAVITY URINE: 1.01
UROBILINOGEN URINE: 0.2 MG/DL
